# Patient Record
Sex: MALE | Race: WHITE | Employment: FULL TIME | URBAN - METROPOLITAN AREA
[De-identification: names, ages, dates, MRNs, and addresses within clinical notes are randomized per-mention and may not be internally consistent; named-entity substitution may affect disease eponyms.]

---

## 2020-05-28 ENCOUNTER — TELEPHONE (OUTPATIENT)
Dept: FAMILY MEDICINE | Facility: CLINIC | Age: 60
End: 2020-05-28

## 2020-05-28 NOTE — TELEPHONE ENCOUNTER
Reason for Call:  Form, our goal is to have forms completed with 72 hours, however, some forms may require a visit or additional information.    Type of letter, form or note:  FMLA    Who is the form from?: Patient    Where did the form come from: Patient or family brought in       What clinic location was the form placed at?: [place]    Where the form was placed: [employer]  Box/Folder    What number is listed as a contact on the form?: [phone]       Additional comments:  Aware of 7- 10 business days, ok to leave voicemail if no answer. Pt will  forms    Call taken on 5/28/2020 at 11:52 AM by Janell Bates

## 2020-05-28 NOTE — TELEPHONE ENCOUNTER
Called the patient @[phone]. Left a VM asking him to return my call to my direct line # [phone]. Needing clarification on what he needs the [facility] paperwork filled out for.  Stacy Pierce TC

## 2020-05-28 NOTE — LETTER
[facility]  [address] [facility] [address] [phone]        May 29, 2020      Anoop Sena  [address] [facility] [address]      Dear Anoop,    As your health care provider, I am concerned that your age and/or underlying medical condition puts you at particularly high risk for serious complications should you contract a COVID-19 infection.     Specifically, you have the following conditions/diagnoses, included as high risk conditions per the Centers for Disease Control and Prevention at CDC.gov.     ***  ***    COVID-19 is a new disease and there is limited information regarding risk factors for severe disease. Based on currently available information and clinical expertise, older adults and people of any age who have serious underlying medical conditions might be at higher risk for severe illness from COVID-19.     It is my medical opinion that you should avoid close contact with others and work from home if possible during this COVID-19 pandemic.     Josh Gallardo MD    [facility]

## 2020-05-29 NOTE — TELEPHONE ENCOUNTER
Called and spoke to the patient @ [phone]. I explained that we had never seen him in the [facility] system and apologized that he was told  was his PCP when in fact he isn't. I also advised that we could not fill out his FMLA paperwork based on this information. He expressed that he does see a neurologist and asked if he should reach out to him. I stated it wouldn't hurt to ask. He understood why we could not address his paperwork here at [facility]. His FMLA forms have been placed at the [facility]  for  per his request.  Stacy Pierce TC

## 2020-05-29 NOTE — TELEPHONE ENCOUNTER
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  He has never been seen in the [facility] system so he should be contacting his primary clinic if he has one.     Maddy Alvarez BSN, RN

## 2020-05-29 NOTE — TELEPHONE ENCOUNTER
"RN team:  Patient dropped off [place] paperwork for  to fill out. I spoke with the patient for clarification on what he needed the paperwork filled out for. He stated he had had a fever and per \"guidelines\" given by his employer was told to stay home for 72 hrs. Patient has never been seen by Dr. Gallardo. I advised that the only thing I could do is possibly provide a letter for his employer, but could not guarantee anything. Needing further advisement for patient if no letter can be provided please.  Thank you.  BRENDA Franz          "

## 2020-09-29 ENCOUNTER — HOSPITAL ENCOUNTER (EMERGENCY)
Facility: CLINIC | Age: 60
Discharge: HOME | End: 2020-09-29
Attending: EMERGENCY MEDICINE | Admitting: EMERGENCY MEDICINE
Payer: OTHER MISCELLANEOUS

## 2020-09-29 ENCOUNTER — APPOINTMENT (OUTPATIENT)
Dept: GENERAL RADIOLOGY | Facility: CLINIC | Age: 60
End: 2020-09-29
Attending: EMERGENCY MEDICINE
Payer: OTHER MISCELLANEOUS

## 2020-09-29 VITALS
TEMPERATURE: 97.9 F | RESPIRATION RATE: 16 BRPM | DIASTOLIC BLOOD PRESSURE: 112 MMHG | SYSTOLIC BLOOD PRESSURE: 149 MMHG | HEART RATE: 86 BPM | OXYGEN SATURATION: 95 %

## 2020-09-29 DIAGNOSIS — S61.216A LACERATION OF RIGHT LITTLE FINGER WITHOUT FOREIGN BODY WITHOUT DAMAGE TO NAIL, INITIAL ENCOUNTER: ICD-10-CM

## 2020-09-29 PROCEDURE — 12001 RPR S/N/AX/GEN/TRNK 2.5CM/<: CPT

## 2020-09-29 PROCEDURE — 25000128 H RX IP 250 OP 636: Performed by: EMERGENCY MEDICINE

## 2020-09-29 PROCEDURE — 90715 TDAP VACCINE 7 YRS/> IM: CPT | Performed by: EMERGENCY MEDICINE

## 2020-09-29 PROCEDURE — 90471 IMMUNIZATION ADMIN: CPT

## 2020-09-29 PROCEDURE — 99283 EMERGENCY DEPT VISIT LOW MDM: CPT | Mod: 25

## 2020-09-29 PROCEDURE — 73130 X-RAY EXAM OF HAND: CPT | Mod: RT

## 2020-09-29 PROCEDURE — 27210282 ZZH ADHESIVE DERMABOND SKIN

## 2020-09-29 RX ORDER — ACETAMINOPHEN 160 MG
100 TABLET,DISINTEGRATING ORAL ONCE
Status: DISCONTINUED | OUTPATIENT
Start: 2020-09-29 | End: 2020-09-30 | Stop reason: HOSPADM

## 2020-09-29 RX ADMIN — CLOSTRIDIUM TETANI TOXOID ANTIGEN (FORMALDEHYDE INACTIVATED), CORYNEBACTERIUM DIPHTHERIAE TOXOID ANTIGEN (FORMALDEHYDE INACTIVATED), BORDETELLA PERTUSSIS TOXOID ANTIGEN (GLUTARALDEHYDE INACTIVATED), BORDETELLA PERTUSSIS FILAMENTOUS HEMAGGLUTININ ANTIGEN (FORMALDEHYDE INACTIVATED), BORDETELLA PERTUSSIS PERTACTIN ANTIGEN, AND BORDETELLA PERTUSSIS FIMBRIAE 2/3 ANTIGEN 0.5 ML: 5; 2; 2.5; 5; 3; 5 INJECTION, SUSPENSION INTRAMUSCULAR at 22:58

## 2020-09-29 NOTE — ED AVS SNAPSHOT
Emergency Department  [address] [place] [address]  Phone:  [phone]  Fax:  [phone]                                    Anoop Sena   MRN: [id number]    Department:   Emergency Department   Date of Visit:  9/29/2020           After Visit Summary Signature Page    I have received my discharge instructions, and my questions have been answered. I have discussed any challenges I see with this plan with the nurse or doctor.    ..........................................................................................................................................  Patient/Patient Representative Signature      ..........................................................................................................................................  Patient Representative Print Name and Relationship to Patient    ..................................................               ................................................  Date                                   Time    ..........................................................................................................................................  Reviewed by Signature/Title    ...................................................              ..............................................  Date                                               Time          22EPIC Rev 08/18

## 2020-09-30 NOTE — ED PROVIDER NOTES
History     Chief Complaint:  \\  Hand Injury (Pt present to the ED via EMS after his right small finger was slammed in an external airplane door resulting in a laceration.)       HPI   Anoop Sena is a 60 year old male who presents with a laceration to his right pinky finger after slamming it in the door of an airplane.  He has no other injury.  He tells me that there is pain, his nailbed is not involved, but that he has no numbness to the tip of the finger.  He tells me the finger is intact.  He is right-hand dominant..    Allergies:  No Known Allergies     Medications:    HYDROcodone-acetaminophen (NORCO) 5-325 MG per tablet  LevETIRAcetam (KEPPRA PO)        Past Medical History:    Past Medical History:   Diagnosis Date     Seizures (H)        There are no active problems to display for this patient.       Past Surgical History:    No past surgical history on file.     Family History:    family history is not on file.    Social History:   reports that he has never smoked. He has never used smokeless tobacco. He reports previous alcohol use. He reports previous drug use.    PCP: No primary care provider on file.     Review of Systems   All other systems reviewed and are negative.        Physical Exam     Patient Vitals for the past 24 hrs:   BP Temp Temp src Pulse Resp SpO2   09/29/20 2202 (!) 149/112 97.9  F (36.6  C) Oral 86 16 95 %        Physical Exam   Vitals: reviewed by me  General: Pt seen on [facility], pleasant, cooperative, and alert to conversation  Eyes: Tracking well, clear conjunctiva BL  Resp:  No tachypnea, no accessory muscle use.   MSK: no obvious peripheral edema or joint effusion.    Right upper extremity with sensation intact light touch to first dorsal webspace, index finger and pinky finger.  Can give the A-OK, thumbs up, 2 3 cross.  Does have a 2 cm laceration extending down the ulnar aspect of his pinky, wound edges are very well approximated.  The nailbed is not  involved.  Skin: No rash, normal turgor and temperature  Neuro: Clear speech and no facial droop.      Emergency Department Course       Imaging:  XR Hand Right G/E 3 Views    (Results Pending)        Laboratory:  Labs Ordered and Resulted from Time of ED Arrival Up to the Time of Departure from the ED - No data to display     Procedures:     Laceration Repair      LACERATION:  A superficial clean 2 cm laceration.    LOCATION:  Ulnar aspect of L pinky, distal.    FUNCTION:  Distally sensation, circulation, motor and tendon function are intact.    ANESTHESIA:  none    PREPARATION:  Irrigation with Normal Saline.    DEBRIDEMENT:  no debridement.    CLOSURE:  Wound was closed with Dermabond.          Impression & Plan      Medical Decision Making:  This is a very pleasant 60-year-old male presents the emergency room with a finger injury to his right pinky there is a superficial laceration, easily repaired with Dermabond since it was so well approximated.  It did not involve the nailbed.  He is neurovascularly intact before and after Dermabond, and feels comfortable going home.  Tdap has been ordered, very clear return to ED precautions were discussed.    Diagnosis:    ICD-10-CM    1. Laceration of right little finger without foreign body without damage to nail, initial encounter  S61.216A         Discharge Medications:  New Prescriptions    No medications on file        9/29/2020   Boby Villanueva*      Boby Villanueva MD  09/29/20 2252

## 2020-09-30 NOTE — ED NOTES
Bed: ED21  Expected date:   Expected time:   Means of arrival:   Comments:  531  60M Smashed finger/No thinners  2153

## 2020-09-30 NOTE — ED TRIAGE NOTES
Pt present to the ED via EMS after his right small finger was slammed in an external airplane door resulting in a laceration.